# Patient Record
Sex: FEMALE | Race: WHITE | NOT HISPANIC OR LATINO | URBAN - METROPOLITAN AREA
[De-identification: names, ages, dates, MRNs, and addresses within clinical notes are randomized per-mention and may not be internally consistent; named-entity substitution may affect disease eponyms.]

---

## 2024-06-01 ENCOUNTER — HOSPITAL ENCOUNTER (EMERGENCY)
Facility: HOSPITAL | Age: 24
Discharge: HOME | End: 2024-06-01
Payer: COMMERCIAL

## 2024-06-01 VITALS
OXYGEN SATURATION: 98 % | SYSTOLIC BLOOD PRESSURE: 112 MMHG | BODY MASS INDEX: 17.75 KG/M2 | HEART RATE: 108 BPM | DIASTOLIC BLOOD PRESSURE: 76 MMHG | TEMPERATURE: 98.6 F | HEIGHT: 64 IN | RESPIRATION RATE: 18 BRPM | WEIGHT: 104 LBS

## 2024-06-01 DIAGNOSIS — O23.41 URINARY TRACT INFECTION IN MOTHER DURING FIRST TRIMESTER OF PREGNANCY (HHS-HCC): ICD-10-CM

## 2024-06-01 DIAGNOSIS — O20.0 THREATENED MISCARRIAGE (HHS-HCC): Primary | ICD-10-CM

## 2024-06-01 LAB
ABO GROUP (TYPE) IN BLOOD: NORMAL
ALBUMIN SERPL BCP-MCNC: 4.3 G/DL (ref 3.4–5)
ALP SERPL-CCNC: 79 U/L (ref 33–110)
ALT SERPL W P-5'-P-CCNC: 11 U/L (ref 7–45)
ANION GAP SERPL CALC-SCNC: 11 MMOL/L (ref 10–20)
ANTIBODY SCREEN: NORMAL
APPEARANCE UR: ABNORMAL
AST SERPL W P-5'-P-CCNC: 15 U/L (ref 9–39)
B-HCG SERPL-ACNC: ABNORMAL MIU/ML
BACTERIA #/AREA URNS AUTO: ABNORMAL /HPF
BASOPHILS # BLD AUTO: 0.04 X10*3/UL (ref 0–0.1)
BASOPHILS NFR BLD AUTO: 0.4 %
BILIRUB SERPL-MCNC: 0.3 MG/DL (ref 0–1.2)
BILIRUB UR STRIP.AUTO-MCNC: NEGATIVE MG/DL
BUN SERPL-MCNC: 9 MG/DL (ref 6–23)
CALCIUM SERPL-MCNC: 9.1 MG/DL (ref 8.6–10.6)
CHLORIDE SERPL-SCNC: 103 MMOL/L (ref 98–107)
CLUE CELLS SPEC QL WET PREP: NORMAL
CO2 SERPL-SCNC: 26 MMOL/L (ref 21–32)
COLOR UR: YELLOW
CREAT SERPL-MCNC: 0.49 MG/DL (ref 0.5–1.05)
EGFRCR SERPLBLD CKD-EPI 2021: >90 ML/MIN/1.73M*2
EOSINOPHIL # BLD AUTO: 0.04 X10*3/UL (ref 0–0.7)
EOSINOPHIL NFR BLD AUTO: 0.4 %
ERYTHROCYTE [DISTWIDTH] IN BLOOD BY AUTOMATED COUNT: 12.6 % (ref 11.5–14.5)
GLUCOSE SERPL-MCNC: 80 MG/DL (ref 74–99)
GLUCOSE UR STRIP.AUTO-MCNC: NORMAL MG/DL
HCT VFR BLD AUTO: 36.7 % (ref 36–46)
HGB BLD-MCNC: 13.3 G/DL (ref 12–16)
IMM GRANULOCYTES # BLD AUTO: 0.04 X10*3/UL (ref 0–0.7)
IMM GRANULOCYTES NFR BLD AUTO: 0.4 % (ref 0–0.9)
KETONES UR STRIP.AUTO-MCNC: ABNORMAL MG/DL
LEUKOCYTE ESTERASE UR QL STRIP.AUTO: ABNORMAL
LYMPHOCYTES # BLD AUTO: 2.33 X10*3/UL (ref 1.2–4.8)
LYMPHOCYTES NFR BLD AUTO: 21.9 %
MCH RBC QN AUTO: 29.2 PG (ref 26–34)
MCHC RBC AUTO-ENTMCNC: 36.2 G/DL (ref 32–36)
MCV RBC AUTO: 81 FL (ref 80–100)
MONOCYTES # BLD AUTO: 0.41 X10*3/UL (ref 0.1–1)
MONOCYTES NFR BLD AUTO: 3.9 %
MUCOUS THREADS #/AREA URNS AUTO: ABNORMAL /LPF
NEUTROPHILS # BLD AUTO: 7.78 X10*3/UL (ref 1.2–7.7)
NEUTROPHILS NFR BLD AUTO: 73 %
NITRITE UR QL STRIP.AUTO: ABNORMAL
NRBC BLD-RTO: 0 /100 WBCS (ref 0–0)
PH UR STRIP.AUTO: 6.5 [PH]
PLATELET # BLD AUTO: 242 X10*3/UL (ref 150–450)
POTASSIUM SERPL-SCNC: 3.7 MMOL/L (ref 3.5–5.3)
PREGNANCY TEST URINE, POC: POSITIVE
PROT SERPL-MCNC: 7 G/DL (ref 6.4–8.2)
PROT UR STRIP.AUTO-MCNC: ABNORMAL MG/DL
RBC # BLD AUTO: 4.56 X10*6/UL (ref 4–5.2)
RBC # UR STRIP.AUTO: NEGATIVE /UL
RBC #/AREA URNS AUTO: ABNORMAL /HPF
RH FACTOR (ANTIGEN D): NORMAL
SODIUM SERPL-SCNC: 136 MMOL/L (ref 136–145)
SP GR UR STRIP.AUTO: 1.03
SQUAMOUS #/AREA URNS AUTO: ABNORMAL /HPF
T VAGINALIS SPEC QL WET PREP: NORMAL
TRICHOMONAS REFLEX COMMENT: NORMAL
UROBILINOGEN UR STRIP.AUTO-MCNC: NORMAL MG/DL
WBC # BLD AUTO: 10.6 X10*3/UL (ref 4.4–11.3)
WBC #/AREA URNS AUTO: ABNORMAL /HPF
WBC VAG QL WET PREP: NORMAL
YEAST VAG QL WET PREP: NORMAL

## 2024-06-01 PROCEDURE — 81001 URINALYSIS AUTO W/SCOPE: CPT | Performed by: PHYSICIAN ASSISTANT

## 2024-06-01 PROCEDURE — 2500000001 HC RX 250 WO HCPCS SELF ADMINISTERED DRUGS (ALT 637 FOR MEDICARE OP): Performed by: PHYSICIAN ASSISTANT

## 2024-06-01 PROCEDURE — 87661 TRICHOMONAS VAGINALIS AMPLIF: CPT | Performed by: PHYSICIAN ASSISTANT

## 2024-06-01 PROCEDURE — 99283 EMERGENCY DEPT VISIT LOW MDM: CPT

## 2024-06-01 PROCEDURE — 76815 OB US LIMITED FETUS(S): CPT | Performed by: STUDENT IN AN ORGANIZED HEALTH CARE EDUCATION/TRAINING PROGRAM

## 2024-06-01 PROCEDURE — 86850 RBC ANTIBODY SCREEN: CPT | Performed by: PHYSICIAN ASSISTANT

## 2024-06-01 PROCEDURE — 87210 SMEAR WET MOUNT SALINE/INK: CPT | Performed by: PHYSICIAN ASSISTANT

## 2024-06-01 PROCEDURE — 36415 COLL VENOUS BLD VENIPUNCTURE: CPT | Performed by: PHYSICIAN ASSISTANT

## 2024-06-01 PROCEDURE — 84702 CHORIONIC GONADOTROPIN TEST: CPT | Performed by: PHYSICIAN ASSISTANT

## 2024-06-01 PROCEDURE — 76815 OB US LIMITED FETUS(S): CPT | Performed by: PHYSICIAN ASSISTANT

## 2024-06-01 PROCEDURE — 87186 SC STD MICRODIL/AGAR DIL: CPT | Performed by: PHYSICIAN ASSISTANT

## 2024-06-01 PROCEDURE — 85025 COMPLETE CBC W/AUTO DIFF WBC: CPT | Performed by: PHYSICIAN ASSISTANT

## 2024-06-01 PROCEDURE — 99284 EMERGENCY DEPT VISIT MOD MDM: CPT | Performed by: PHYSICIAN ASSISTANT

## 2024-06-01 PROCEDURE — 80053 COMPREHEN METABOLIC PANEL: CPT | Performed by: PHYSICIAN ASSISTANT

## 2024-06-01 PROCEDURE — 81025 URINE PREGNANCY TEST: CPT

## 2024-06-01 PROCEDURE — 87591 N.GONORRHOEAE DNA AMP PROB: CPT | Performed by: PHYSICIAN ASSISTANT

## 2024-06-01 RX ORDER — ACETAMINOPHEN 325 MG/1
975 TABLET ORAL ONCE
Status: COMPLETED | OUTPATIENT
Start: 2024-06-01 | End: 2024-06-01

## 2024-06-01 RX ORDER — CEPHALEXIN 500 MG/1
500 CAPSULE ORAL 4 TIMES DAILY
Qty: 28 CAPSULE | Refills: 0 | Status: SHIPPED | OUTPATIENT
Start: 2024-06-01 | End: 2024-06-08

## 2024-06-01 RX ORDER — CEPHALEXIN 250 MG/1
500 CAPSULE ORAL ONCE
Status: COMPLETED | OUTPATIENT
Start: 2024-06-01 | End: 2024-06-01

## 2024-06-01 RX ADMIN — ACETAMINOPHEN 975 MG: 325 TABLET ORAL at 21:25

## 2024-06-01 RX ADMIN — CEPHALEXIN 500 MG: 250 CAPSULE ORAL at 23:31

## 2024-06-01 ASSESSMENT — PAIN SCALES - GENERAL: PAINLEVEL_OUTOF10: 7

## 2024-06-01 ASSESSMENT — COLUMBIA-SUICIDE SEVERITY RATING SCALE - C-SSRS
2. HAVE YOU ACTUALLY HAD ANY THOUGHTS OF KILLING YOURSELF?: NO
1. IN THE PAST MONTH, HAVE YOU WISHED YOU WERE DEAD OR WISHED YOU COULD GO TO SLEEP AND NOT WAKE UP?: NO
6. HAVE YOU EVER DONE ANYTHING, STARTED TO DO ANYTHING, OR PREPARED TO DO ANYTHING TO END YOUR LIFE?: NO

## 2024-06-01 ASSESSMENT — PAIN - FUNCTIONAL ASSESSMENT: PAIN_FUNCTIONAL_ASSESSMENT: 0-10

## 2024-06-01 NOTE — Clinical Note
Piyush Jiang was seen and treated in our emergency department on 6/1/2024.  She may return to work on 06/03/2024.       If you have any questions or concerns, please don't hesitate to call.      Liudmila Martinez PA-C

## 2024-06-02 LAB
C TRACH RRNA SPEC QL NAA+PROBE: POSITIVE
HOLD SPECIMEN: NORMAL
N GONORRHOEA DNA SPEC QL PROBE+SIG AMP: NEGATIVE

## 2024-06-02 NOTE — ED PROCEDURE NOTE
Procedure    Performed by: Gamaliel Topete MD  Authorized by: Liudmila Martinez PA-C        Genitourinary Indications: non-obstetric vaginal bleeding          Procedure: Pelvic Ultrasound    Exam: transabdominal exam  Findings:  IUP: The pelvis was visualized and there was an INTRAUTERINE PREGNANCY visualized (a yolk sac, fetal pole or fetus was seen).  Fetal Heart Rate: 177 bpm  Pelvic Free Fluid: The pelvis was visualized and was NEGATIVE for free fluid.    Impression:  Pelvis: The focused pelvic ultrasound showed an INTRAUTERINE PREGNANCY.    Comments: + subchorionic hemorrhage/hematoma               Gamaliel Topete MD  Resident  06/01/24 3180
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
## 2024-06-02 NOTE — ED PROVIDER NOTES
"This is a 23-year-old female with no significant past medical history of asthma who presents to the ED with pelvic pain as well as vaginal bleeding in pregnancy.  She states that she is approximately 10 weeks pregnant.  She is unsure of her first day of her last menstrual cycle.  She states that for the past 2 weeks she has had some vaginal spotting, however today she tripped and fell and hit her abdomen on a door.  She states that since then the bleeding got heavier.  She is unsure of how many pads or tampons she has bled through.  She is endorsing pelvic cramping.  She has not yet seen OB/GYN for this pregnancy.  She denies any lightheadedness or dizziness.  She is .  She denies any problems with her previous pregnancy.  She denies any vaginal discharge vaginal bleeding.  Denies any urinary symptoms, nausea, vomiting, diarrhea, or syncopal episodes.      History provided by:  Patient   used: No             Visit Vitals  /76   Pulse (!) 108   Temp 37 °C (98.6 °F)   Resp 18   Ht 1.626 m (5' 4\")   Wt 47.2 kg (104 lb)   SpO2 98%   BMI 17.85 kg/m²   BSA 1.46 m²          Physical Exam     Physical exam:    General: Vitals noted, no distress. Afebrile.    EENT: PERRL, EOM's intact. Eye lids without lesions. No scleral icterus. Normal phonation. Nares patent. MMM.     Cardiac: Regular, rate, rhythm, no murmur.    Pulmonary: Lungs clear bilaterally with good aeration. No adventitious breath sounds.    Abdomen: Tender palpation suprapubic region.  No rebound or guarding. soft, nonsurgical. No peritoneal signs. Normoactive bowel sounds. No CVA tenderness.     Pelvic Exam: Chaperone present. External genitalia normal. No rashes or lesions. Speculum exam shows no lesions on the cervix.  Small amount of thin white discharge within the vagina.  Small mount of blood within the vagina without any visible clots or products of conception.   No active bleeding from cervical os.  Closed cervical os. " No signif signs, symptoms or concerns except had vaginal spotting after voiding today. No trauma or recent coitus. No bleeding noted on exam and cervix LTC. Abd non-tender. Observe. Precautions given. Advice appropriate to gestational age reviewed including pertinent Checklist items. Discussed condition, tests and care plan including RBA. Problem list updated.   A/P:  Bernice was seen today for prenatal care.    Diagnoses and all orders for this visit:    Rubella non-immune status, antepartum    Encounter for supervision of normal first pregnancy in third trimester        Eddie Richards MD          Bimanual exam shows no adnexal pain or mass. No cervical motion tenderness.    Extremities: No peripheral edema. Full range of motion. Moves all extremities freely.     Skin: No rash. Warm and dry. No discoloration noted.     Neuro: No focal neurologic deficits noted.  Pt is A&O x3, speech is clear, moves all extremities independently sensation is intact.          Labs Reviewed   HUMAN CHORIONIC GONADOTROPIN, SERUM QUANTITATIVE - Abnormal       Result Value    HCG, Beta-Quantitative 112,625 (*)     Narrative:     Total HCG measurement is performed using the [employer] Mobile immunoassay which detects intact HCG and free beta HCG subunit.  This test is not indicated for use as a tumor marker.  HCG testing is performed using a different test methodology at [facility] than other [facility]. Direct result comparison  should only be made within the same method.          COMPREHENSIVE METABOLIC PANEL - Abnormal    Glucose 80      Sodium 136      Potassium 3.7      Chloride 103      Bicarbonate 26      Anion Gap 11      Urea Nitrogen 9      Creatinine 0.49 (*)     eGFR >90      Calcium 9.1      Albumin 4.3      Alkaline Phosphatase 79      Total Protein 7.0      AST 15      Bilirubin, Total 0.3      ALT 11     CBC WITH AUTO DIFFERENTIAL - Abnormal    WBC 10.6      nRBC 0.0      RBC 4.56      Hemoglobin 13.3      Hematocrit 36.7      MCV 81      MCH 29.2      MCHC 36.2 (*)     RDW 12.6      Platelets 242      Neutrophils % 73.0      Immature Granulocytes %, Automated 0.4      Lymphocytes % 21.9      Monocytes % 3.9      Eosinophils % 0.4      Basophils % 0.4      Neutrophils Absolute 7.78 (*)     Immature Granulocytes Absolute, Automated 0.04      Lymphocytes Absolute 2.33      Monocytes Absolute 0.41      Eosinophils Absolute 0.04      Basophils Absolute 0.04     POCT PREGNANCY, URINE - Abnormal    Preg Test, Ur Positive (*)    TRICHOMONAS WET PREP REFLEX TO PCR    Trichomonas None Seen      Clue Cells  None Seen      Yeast None Seen      WBC 9-20      Trichomonas reflex comment        Value: Trichomonas was not seen by wet prep. Reflex Trichomonas vaginalis by Amplified Detection.   URINALYSIS WITH REFLEX CULTURE AND MICROSCOPIC    Narrative:     The following orders were created for panel order Urinalysis with Reflex Culture and Microscopic.  Procedure                               Abnormality         Status                     ---------                               -----------         ------                     Urinalysis with Reflex C...[[id number]]                                                 Extra Urine Gray Tube[[id number]]                                                         Please view results for these tests on the individual orders.   C. TRACHOMATIS + N. GONORRHOEAE, AMPLIFIED   TYPE AND SCREEN   URINALYSIS WITH REFLEX CULTURE AND MICROSCOPIC   EXTRA URINE GRAY TUBE   TRICH VAGINALIS, AMPLIFIED       Point of Care Ultrasound               ED Course & MDM     Medical Decision Making  This is a 23-year-old female with past medical history of asthma who presents to the ED with abdominal pain as well as vaginal bleeding in pregnancy.  Vitals did show she was tachycardic 108 bpm.  On examination she was tender to palpation to her suprapubic region.  No rebound or guarding.  Abdomen otherwise soft and nontender.  No CVA tenderness.  Pelvic examination performed chaperone present which showed small amount of blood within the vagina.  No active bleeding from cervical os.  No cervical motion tenderness.  No adnexal pain or masses palpated.  Wet prep as well as GC and chlamydia swabs obtained during pelvic examination.  IV established laboratory studies obtained.  Patient medicated with Tylenol.  Bedside ultrasound performed.  Ultrasound showed a live IUP.  See separate procedure for further details.  Per review of her records patient is Rh+ so she does not require RhoGAM today.  CBC, CMP both grossly  unremarkable.  Wet prep negative.  Beta quant 112,625, consistent with patient's gestational age.  Urinalysis was concerning for UTI.  The patient was informed of these results.  She was given a dose of Keflex here in the ED to treat for UTI as well as a prescription for Keflex to go home with.  She was advised to follow with OB/GYN and was given information for the OB/GYN clinic to follow-up with.  She was agreeable to this plan.  On my reassessment she was feeling improved and felt comfortable and discharged from the ED.  She had no further questions at time of discharge.    Amount and/or Complexity of Data Reviewed  Labs: ordered.  Radiology: ordered.    Risk  Prescription drug management.         Diagnoses as of 06/01/24 2346   Threatened miscarriage ([facility])   Urinary tract infection in mother during first trimester of pregnancy ([facility])       Procedures    LAUREN Serrano, NAVA Martinez PA-C  06/01/24 0911

## 2024-06-03 ENCOUNTER — TELEPHONE (OUTPATIENT)
Dept: PHARMACY | Facility: HOSPITAL | Age: 24
End: 2024-06-03
Payer: COMMERCIAL

## 2024-06-03 DIAGNOSIS — A74.9 CHLAMYDIA: Primary | ICD-10-CM

## 2024-06-03 LAB — T VAGINALIS RRNA SPEC QL NAA+PROBE: POSITIVE

## 2024-06-03 RX ORDER — AZITHROMYCIN 500 MG/1
1000 TABLET, FILM COATED ORAL DAILY
Qty: 2 TABLET | Refills: 0 | Status: SHIPPED | OUTPATIENT
Start: 2024-06-03 | End: 2024-06-04

## 2024-06-03 RX ORDER — AZITHROMYCIN 500 MG/1
1000 TABLET, FILM COATED ORAL DAILY
Qty: 2 TABLET | Refills: 0 | Status: SHIPPED | OUTPATIENT
Start: 2024-06-03 | End: 2024-06-03 | Stop reason: SDUPTHER

## 2024-06-03 NOTE — PROGRESS NOTES
EDPD Note: Initiation     Contacted Ms. Piyush Jiang regarding a positive chlamydia result that was taken during their recent emergency room visit. I completed education with patient. The patient is not being treated appropriately with no medication.     Patient presented to the ED for concerns for vaginal bleeding while pregnant. She was discharged with keflex 500 mg QID x 7 days for suspected UTI. The urine result is still pending. Will send the following medication for chlamydia coverage.     The following prescription was sent to the patient's preferred pharmacy. No further follow up needed from EDPD Team.   Drug: Azithromycin 500 mg    Sig: Take 2 tablets one time  Days Supply: 1    Contains abnormal data C. Trachomatis / N. Gonorrhoeae, Amplified Detection: [id number]  Order: [id number]   Collected 6/1/2024 21:25       Status: Final result       Visible to patient: No (inaccessible in [facility])    0 Result Notes      Component  Ref Range & Units    Neisseria gonorrhea,Amplified  Negative Negative   Chlamydia trachomatis, Amplified  Negative Positive Abnormal           Morelia Flores, PharmD

## 2024-06-04 ENCOUNTER — TELEPHONE (OUTPATIENT)
Dept: PHARMACY | Facility: HOSPITAL | Age: 24
End: 2024-06-04
Payer: COMMERCIAL

## 2024-06-04 DIAGNOSIS — A59.9 TRICHOMONAS INFECTION: Primary | ICD-10-CM

## 2024-06-04 LAB — BACTERIA UR CULT: ABNORMAL

## 2024-06-04 RX ORDER — METRONIDAZOLE 500 MG/1
500 TABLET ORAL 2 TIMES DAILY
Qty: 14 TABLET | Refills: 0 | Status: SHIPPED | OUTPATIENT
Start: 2024-06-04 | End: 2024-06-11

## 2024-06-04 NOTE — PROGRESS NOTES
EDPD Note: Initiation     Contacted Piyush Jiang regarding a positive trichomonas result that was taken during their recent emergency room visit. I completed education with patient . The patient is not being treated appropriately.      Patient presented to the ED due to pelvic pain as well as vaginal bleeding in pregnancy. Patient advised to follow with OB/GYN.    The following prescription was sent to the patient's preferred pharmacy. No further follow up needed from EDPD Team.    Drug: metronidazole 500 mg   Sig: Take 1 tablet by mouth twice daily for 7 days  Qty: 14  Days Supply: 7    If there are any other questions for the ED Post-Discharge Culture Follow Up Team, please contact [phone]. Fax: [phone].    Alexsander NguyenD

## 2024-06-05 ENCOUNTER — TELEPHONE (OUTPATIENT)
Dept: PHARMACY | Facility: HOSPITAL | Age: 24
End: 2024-06-05
Payer: COMMERCIAL

## 2024-06-05 NOTE — PROGRESS NOTES
EDPD Note: Antibiotics Reviewed and Warranted    Contacted  Piyush Jiang regarding a positive urine culture/result that was taken during their recent emergency room visit. I completed education with patient . The patient is being treated appropriately with Keflex 500 mg QID x 7 days.    Patient was seen in ED for  pelvic pain and vaginal bleeding. [facility] team has recently called regarding trichomonas and chlamydia positive results which were treated at time of those calls. Regarding urine culture, patient is currently pregnant and should be treated empirically for UTI. Keflex is susceptible and appropriate treatment for UTI in pregnancy.     Susceptibility data from last 90 days.  Collected Specimen Info Organism Amoxicillin/Clavulanate Ampicillin Ampicillin/Sulbactam Cefazolin Cefazolin (uncomplicated UTIs only) Ceftriaxone Ciprofloxacin Gentamicin Nitrofurantoin Piperacillin/Tazobactam   06/01/24 Urine from Clean Catch/Voided Escherichia coli  S  R  R  I  S  S  S  S  S  S     Collected Specimen Info Organism Trimethoprim/Sulfamethoxazole   06/01/24 Urine from Clean Catch/Voided Escherichia coli  S        No further follow up needed from EDPD Team.     Mauro Weiss, PharmD

## 2024-06-21 ENCOUNTER — TELEPHONE (OUTPATIENT)
Dept: OBSTETRICS AND GYNECOLOGY | Facility: HOSPITAL | Age: 24
End: 2024-06-21
Payer: COMMERCIAL

## 2024-06-21 DIAGNOSIS — O46.90 VAGINAL BLEEDING IN PREGNANCY (HHS-HCC): ICD-10-CM

## 2024-06-21 DIAGNOSIS — O26.891 ABDOMINAL PAIN DURING PREGNANCY IN FIRST TRIMESTER (HHS-HCC): ICD-10-CM

## 2024-06-21 DIAGNOSIS — R10.9 ABDOMINAL PAIN DURING PREGNANCY IN FIRST TRIMESTER (HHS-HCC): ICD-10-CM
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
